# Patient Record
Sex: FEMALE | Race: WHITE | NOT HISPANIC OR LATINO | Employment: STUDENT | URBAN - METROPOLITAN AREA
[De-identification: names, ages, dates, MRNs, and addresses within clinical notes are randomized per-mention and may not be internally consistent; named-entity substitution may affect disease eponyms.]

---
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
## 2025-06-30 ENCOUNTER — OFFICE VISIT (OUTPATIENT)
Dept: URGENT CARE | Age: 20
End: 2025-06-30
Payer: COMMERCIAL

## 2025-06-30 VITALS
TEMPERATURE: 98 F | DIASTOLIC BLOOD PRESSURE: 67 MMHG | WEIGHT: 110 LBS | OXYGEN SATURATION: 99 % | RESPIRATION RATE: 20 BRPM | HEART RATE: 108 BPM | BODY MASS INDEX: 20.12 KG/M2 | SYSTOLIC BLOOD PRESSURE: 109 MMHG

## 2025-06-30 DIAGNOSIS — R10.84 GENERALIZED ABDOMINAL PRESSURE: ICD-10-CM

## 2025-06-30 DIAGNOSIS — N30.01 ACUTE CYSTITIS WITH HEMATURIA: Primary | ICD-10-CM

## 2025-06-30 DIAGNOSIS — R39.198 DIFFICULTY IN URINATION: ICD-10-CM

## 2025-06-30 LAB
POC APPEARANCE, URINE: ABNORMAL
POC BILIRUBIN, URINE: ABNORMAL
POC BLOOD, URINE: ABNORMAL
POC COLOR, URINE: YELLOW
POC GLUCOSE, URINE: NEGATIVE MG/DL
POC KETONES, URINE: NEGATIVE MG/DL
POC LEUKOCYTES, URINE: NEGATIVE
POC NITRITE,URINE: POSITIVE
POC PH, URINE: 6 PH
POC PROTEIN, URINE: ABNORMAL MG/DL
POC SPECIFIC GRAVITY, URINE: >=1.03
POC UROBILINOGEN, URINE: 0.2 EU/DL
PREGNANCY TEST URINE, POC: NEGATIVE

## 2025-06-30 RX ORDER — NORGESTIMATE AND ETHINYL ESTRADIOL 7DAYSX3 LO
KIT ORAL
COMMUNITY
Start: 2025-06-29

## 2025-06-30 RX ORDER — FLUOXETINE 20 MG/1
1 CAPSULE ORAL
COMMUNITY
Start: 2024-08-15

## 2025-06-30 RX ORDER — DEXTROAMPHETAMINE SULFATE, DEXTROAMPHETAMINE SACCHARATE, AMPHETAMINE SULFATE AND AMPHETAMINE ASPARTATE 6.25; 6.25; 6.25; 6.25 MG/1; MG/1; MG/1; MG/1
CAPSULE, EXTENDED RELEASE ORAL DAILY
COMMUNITY
Start: 2025-06-23 | End: 2025-07-23

## 2025-06-30 RX ORDER — ARIPIPRAZOLE 5 MG/1
2.5 TABLET ORAL
COMMUNITY
Start: 2023-06-02

## 2025-06-30 RX ORDER — NITROFURANTOIN 25; 75 MG/1; MG/1
100 CAPSULE ORAL 2 TIMES DAILY
Qty: 10 CAPSULE | Refills: 0 | Status: SHIPPED | OUTPATIENT
Start: 2025-06-30 | End: 2025-07-05

## 2025-06-30 NOTE — PROGRESS NOTES
Subjective   Patient ID: Susan Poole is a 20 y.o. female. They present today with a chief complaint of Difficulty Urinating (Pressure and discomfort with urination some discharge and odor/).    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 She has never used smokeless tobacco.    Review of Systems  Review of Systems   All other systems reviewed and are negative.                                 Objective    Vitals:    06/30/25 1452   BP: 109/67   BP Location: Left arm   Patient Position: Sitting   BP Cuff Size: Adult   Pulse: 108   Resp: 20   Temp: 36.7 °C (98 °F)   TempSrc: Oral   SpO2: 99%   Weight: 49.9 kg (110 lb)     Patient's last menstrual period was 06/23/2025 (approximate).    Physical Exam  Vitals and nursing note reviewed.   Constitutional:       General: She is not in acute distress.     Appearance: Normal appearance. She is normal weight. She is not ill-appearing, toxic-appearing or diaphoretic.   HENT:      Head: Normocephalic and atraumatic.      Mouth/Throat:      Mouth: Mucous membranes are moist.   Eyes:      General: No scleral icterus.        Right eye: No discharge.         Left eye: No discharge.      Extraocular Movements: Extraocular movements intact.      Conjunctiva/sclera: Conjunctivae normal.      Pupils: Pupils are equal, round, and reactive to light.   Cardiovascular:      Rate and Rhythm: Normal rate and regular rhythm.      Pulses: Normal pulses.      Heart sounds: Normal heart sounds. No murmur heard.     No friction rub. No gallop.   Pulmonary:      Effort: Pulmonary effort is normal. No respiratory distress.      Breath sounds: No wheezing, rhonchi or rales.   Abdominal:       General: Abdomen is flat.      Tenderness: There is no abdominal tenderness. There is no right CVA tenderness, left CVA tenderness, guarding or rebound.   Musculoskeletal:         General: Normal range of motion.      Cervical back: Normal range of motion and neck supple. No rigidity or tenderness.   Lymphadenopathy:      Cervical: No cervical adenopathy.   Skin:     General: Skin is warm and dry.      Capillary Refill: Capillary refill takes less than 2 seconds.      Coloration: Skin is not jaundiced or pale.      Findings: No rash.   Neurological:      General: No focal deficit present.      Mental Status: She is alert.      Gait: Gait normal.   Psychiatric:         Mood and Affect: Mood normal.         Behavior: Behavior normal.         Procedures    Point of Care Test & Imaging Results from this visit  Results for orders placed or performed in visit on 06/30/25   Urine Culture    Specimen: Clean Catch/Voided; Urine   Result Value Ref Range    CULTURE, URINE, ROUTINE SEE NOTE (A)    POCT UA Automated manually resulted   Result Value Ref Range    POC Color, Urine Yellow Straw, Yellow, Light-Yellow    POC Appearance, Urine Cloudy (A) Clear    POC Glucose, Urine NEGATIVE NEGATIVE mg/dl    POC Bilirubin, Urine SMALL (1+) (A) NEGATIVE    POC Ketones, Urine NEGATIVE NEGATIVE mg/dl    POC Specific Gravity, Urine >=1.030 1.005 - 1.035    POC Blood, Urine SMALL (1+) (A) NEGATIVE    POC PH, Urine 6.0 No Reference Range Established PH    POC Protein, Urine 30 (1+) (A) NEGATIVE mg/dl    POC Urobilinogen, Urine 0.2 0.2, 1.0 EU/DL    Poc Nitrite, Urine POSITIVE (A) NEGATIVE    POC Leukocytes, Urine NEGATIVE NEGATIVE   POCT pregnancy, urine manually resulted   Result Value Ref Range    Preg Test, Ur Negative Negative      Imaging  No results found.    Cardiology, Vascular, and Other Imaging  No other imaging results found for the past 2 days      Diagnostic study results (if any) were reviewed by Joana Terry  STEFANY.    Assessment/Plan   Allergies, medications, history, and pertinent labs/EKGs/Imaging reviewed by Joana Terry PA-C.     Medical Decision Making  20 year olf F otherwise healthy presents with complaint of dysuria, urgency and frequency for a couple of days.  Denies fever, chills, abdominal pain, flank pain, nausea or vomiting, lightheadedness or malaise.   Denies pregnancy.  Denies concern for STI, no abnormal vaginal discharge.  UA nitrites and blood suspect UTI.  Hcg neg.  No features pyelonephritis, infected ureterolithiasis, PID, acute abdomen or other emergency.  Will treat with Macrobid and send urine culture.  Encouraged follow-up with primary care provider.  Discussed expected course, indications for return or for presentation to emergency department.  Discharged good condition agreeable to plan as discussed.      Orders and Diagnoses  Diagnoses and all orders for this visit:  Acute cystitis with hematuria  -     nitrofurantoin, macrocrystal-monohydrate, (Macrobid) 100 mg capsule; Take 1 capsule (100 mg) by mouth 2 times a day for 5 days.  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    Anterior tibialis tendinitis of left lower extremity    Body mass index (BMI) pediatric, 5th percentile to less than 85th percentile for age 07/17/2021    BMI (body mass index), pediatric, 5% to less than 85% for age    Body mass index (BMI) pediatric, 5th percentile to less than 85th percentile for age 07/24/2020    BMI (body mass index), pediatric, 5% to less than 85% for age    Cellulitis of left toe 06/11/2021    Paronychia  of toe of left foot    Chronic ethmoidal sinusitis 12/21/2018    Chronic ethmoidal sinusitis    Encounter for immunization 07/16/2021    Encounter for immunization    Encounter for routine child health examination with abnormal findings 07/24/2018    Encounter for routine child health examination with abnormal findings    Encounter for routine child health examination without abnormal findings 07/24/2020    Encounter for routine child health examination without abnormal findings    Iron deficiency 04/20/2018    Low iron    Other conditions influencing health status 05/06/2021    Paronychia    Other feeding difficulties 02/18/2021    Food aversion    Other injury of other muscle(s) and tendon(s) at lower leg level, unspecified leg, initial encounter 10/05/2020    Anterior shin splints    Other insect allergy status 08/28/2022    Allergic reaction to insect bite    Other specified abnormal findings of blood chemistry 04/20/2018    Low vitamin D level    Pain in leg, unspecified     Leg pain    Personal history of diseases of the skin and subcutaneous tissue 12/21/2018    History of urticaria    Personal history of diseases of the skin and subcutaneous tissue 12/21/2018    History of urticaria    Personal history of other diseases of the digestive system 02/05/2021    History of gastroesophageal reflux (GERD)    Personal history of other diseases of the digestive system 08/03/2019    History of constipation    Personal history of other diseases of the respiratory system 03/01/2022    History of acute sinusitis    Personal history of other diseases of the respiratory system 04/06/2020    History of acute sinusitis    Personal history of other mental and behavioral disorders 12/21/2018    History of anxiety disorder    Personal history of other mental and behavioral disorders 12/21/2018    History of depression    Personal history of other specified conditions 04/06/2018    History of abnormal weight loss    Personal  history of other specified conditions 02/05/2021    History of abdominal pain   [3] History reviewed. No pertinent surgical history.

## 2025-06-30 NOTE — PATIENT INSTRUCTIONS
Thank you for visiting  urgent care.     Go to emergency department if you experience fever, chills, nausea, vomiting, generalized malaise, abdominal pain or flank pain or any other new or worsening symptoms.    You will be called in 4 days if urine culture indicates need for change of antibiotic.    Follow-up with primary care provider for recheck of symptoms.

## 2025-07-02 LAB — BACTERIA UR CULT: ABNORMAL

## 2025-07-04 ENCOUNTER — TELEPHONE (OUTPATIENT)
Dept: URGENT CARE | Age: 20
End: 2025-07-04

## 2025-07-04 NOTE — TELEPHONE ENCOUNTER
The antibiotic prescribed at time of visit is susceptible to the bacteria that was cultured according to the lab. No medication change required.